# Patient Record
Sex: FEMALE | Race: WHITE | URBAN - METROPOLITAN AREA
[De-identification: names, ages, dates, MRNs, and addresses within clinical notes are randomized per-mention and may not be internally consistent; named-entity substitution may affect disease eponyms.]

---

## 2021-04-15 ENCOUNTER — OFFICE VISIT CONVERTED (OUTPATIENT)
Dept: NEUROLOGY | Facility: CLINIC | Age: 36
End: 2021-04-15
Attending: NURSE PRACTITIONER

## 2021-05-11 NOTE — H&P
History and Physical      Patient Name: Odalys Crane   Patient ID: [id number]   Sex: Female   YOB: 1985    Referring Provider: RUBY HAMILTON MD    Visit Date: April 15, 2021    Provider: DAVID Dang   Location: [facility]   Location Address: [address] [place] [address]  [id number]   Location Phone: [phone]          Chief Complaint  · Back and bilateral leg pain     New patient presenting with severe low back pain radiating bilaterally to the lower extremities. Recent MRI of Lumbar Spine from [facility] brought on disc       History Of Present Illness  The patient is a 35 year old female, who presents on referral from RUBY HAMILTON MD, for a neurosurgical evaluation of low back pain, right leg pain, left leg pain, and bilateral lower extremity weakness.   The right leg pain involves the right S1 distribution. The left leg pain involves the left S1 distribution. The pain developed gradually several years ago. It is 8/10 in severity has a sharp and a boring quality and radiates into the bilateral sciatic nerve distribution. The pain has been constant. The pain tends to be maximal at no specific time, but waxes and wanes in severity throughout the day. The patient states the pain is aggravated by prolonged sitting, prolonged standing, lifting, and housework. It is alleviated by rest, pain medication, and lying supine. The weakness in the right leg developed about one year ago, is mild to moderate and involves the leg diffusely. The left-sided leg weakness started about one year ago, is mild to moderate and involves the leg diffusely.   She also reports difficulty walking, leg pain, and bilateral buttock pain. The ambulation difficulties are characterized by occasional falls that started about one year ago. The pain is moderate, intermittent, involves both legs, and has an aching and a throbbing quality. It is aggravated by nothing  "in particular, and alleviated by rest and pain medication. She denies bowel or bladder dysfunction. The patient has no prior history of neck or back surgery.   RECENT INTERVENTIONS:  She has been previously treated with physical therapy, epidural steroids, and pain medication. The physical therapy was ineffective in relieving the pain. The epidural steroids were transiently effective.   INFORMATION REVIEWED:  The following information was reviewed: radiology reports and images. MRI of the lumbar spine revealed small disc desiccation at L5/S1. 9mm perineural cyst at the neural foramen at L5/S1, causing mild neural foraminal narrowing on the right, left side is patent, minimal canal stenosis.       Past Medical History  Asthma; Herniated Disc; Leg pain; Lumbar pain; Migraines         Past Surgical History  Hand surgery; Hysterectomy         Medication List  isosorbide dinitrate 5 mg oral tablet; Neurontin 100 mg oral capsule; oxycodone 10 mg oral tablet; risedronate 5 mg oral tablet; trazodone 50 mg oral tablet; Xanax 0.25 mg oral tablet         Allergy List  Codeine Sulfate; Mucinex; tramadol       Allergies Reconciled  Family Medical History  Family history of Arthritis; Family history of cancer; Family history of heart disease         Social History  Tobacco (Never)         Review of Systems  · Constitutional  o Admits  o : fatigue  · Cardiovascular  o Admits  o : easy bruising or bleeding  · Neurologic  o Admits  o : headache, falls, dizziness/vertigo, difficulty with sleep, numbness/tingling/paresthesia, weakness  · Musculoskeletal  o Admits  o : sciatica, neck stiffness/pain, low back pain  · Psychiatric  o Admits  o : anxiety, depression      Vitals  Date Time BP Position Site L\R Cuff Size HR RR TEMP (F) WT  HT  BMI kg/m2 BSA m2 O2 Sat FR L/min FiO2        04/15/2021 03:57 /68 Sitting    80 - R 67 97.6 117lbs 8oz 5'  6\" 18.96 1.58 100 %            Physical " Examination  · Constitutional  o Appearance  o : well-nourished, well developed, alert, in no acute distress  · Respiratory  o Respiratory Effort  o : breathing unlabored  · Cardiovascular  o Peripheral Vascular System  o :   § Extremities  § : no edema or cyanosis  · Musculoskeletal  o Spine  o :   § Inspection/Palpation  § : lumbar paraspinal tenderness  o Right Lower Extremity  o :   § Inspection/Palpation  § : no joint or limb tenderness to palpation, no edema present, no ecchymosis  § Joint Stability  § : joint stability within normal limits  § Range of Motion  § : range of motion normal, no joint crepitations present, no pain on motion, Jack's test negative  o Left Lower Extremity  o :   § Inspection/Palpation  § : no joint or limb tenderness to palpation, no edema present, no ecchymosis  § Joint Stability  § : joint stability within normal limits  § Range of Motion  § : range of motion normal, no joint crepitations present, no pain on motion, Jack's test negative  · Skin and Subcutaneous Tissue  o Extremities  o :   § Right Lower Extremity  § : no lesions or areas of discoloration  § Left Lower Extremity  § : no lesions or areas of discoloration  o Back  o : no lesions or areas of discoloration  · Neurologic  o Mental Status Examination  o :   § Orientation  § : alert and oriented to time, person, place and events  o Motor Examination  o :   § RLE Strength  § : strength normal, poor effort  § RLE Motor Function  § : tone normal, no atrophy, no abnormal movements noted  § LLE Strength  § : strength normal, poor effort  § LLE Motor Function  § : tone normal, no atrophy, no abnormal movements noted  o Reflexes  o :   § RLE  § : knee and ankle reflexes 2/4, pain with SLR   § LLE  § : knee and ankle reflexes 2/4, pain SLR   o Sensation  o :   § Light Touch  § : sensation intact to light touch in extremities  o Gait and Station  o :   § Gait Screening  § : normal gait, able to stand without  difficulty  · Psychiatric  o Mood and Affect  o : mood normal, affect appropriate          Assessment  · Lumbago/low back pain     724.3/M54.40  · Perineural cyst     355.9/G96.191       Pt with chronic low back pain greater than 10 years. Lumbar MRI shows perineural cyst at L5/S1 on the right neural foramen, with mild disc bulge. Mild neural foraminal stenosis on the right, none to the left, very mild canal stenosis. Exam is unremarkable. Would not recommend surgery at this time. She is followed by pain management. Could consider transforaminal epidural injection in this territory. Follow up as needed.       Plan  · Instructions  o Encouraged to follow-up with Primary Care Provider for preventative care.  o The ROS and the PFSH were reviewed at today's visit.  o I have discussed the risks and benefits of surgery versus physical therapy, epidural steroids, and other conservative forms of treatment.  o We have discussed the benefits of core strengthening. Patient will work on improving the core muscle strength with low impact activities such as walking, swimming, Pilates, etc.   o Call or return to office if symptoms worsen or persist.            Electronically Signed by: DAVID Dang -Author on April 15, 2021 05:16:40 PM

## 2021-05-14 VITALS
DIASTOLIC BLOOD PRESSURE: 68 MMHG | HEIGHT: 66 IN | WEIGHT: 117.5 LBS | OXYGEN SATURATION: 100 % | RESPIRATION RATE: 67 BRPM | HEART RATE: 80 BPM | TEMPERATURE: 97.6 F | BODY MASS INDEX: 18.88 KG/M2 | SYSTOLIC BLOOD PRESSURE: 127 MMHG